# Patient Record
Sex: MALE | Race: WHITE | NOT HISPANIC OR LATINO | Employment: FULL TIME | URBAN - NONMETROPOLITAN AREA
[De-identification: names, ages, dates, MRNs, and addresses within clinical notes are randomized per-mention and may not be internally consistent; named-entity substitution may affect disease eponyms.]

---

## 2018-01-05 ENCOUNTER — OFFICE VISIT CONVERTED (OUTPATIENT)
Dept: FAMILY MEDICINE CLINIC | Age: 48
End: 2018-01-05
Attending: FAMILY MEDICINE

## 2018-11-20 ENCOUNTER — OFFICE VISIT CONVERTED (OUTPATIENT)
Dept: FAMILY MEDICINE CLINIC | Age: 48
End: 2018-11-20
Attending: NURSE PRACTITIONER

## 2019-10-04 ENCOUNTER — OFFICE VISIT CONVERTED (OUTPATIENT)
Dept: FAMILY MEDICINE CLINIC | Age: 49
End: 2019-10-04
Attending: NURSE PRACTITIONER

## 2019-10-07 ENCOUNTER — HOSPITAL ENCOUNTER (OUTPATIENT)
Dept: OTHER | Facility: HOSPITAL | Age: 49
Discharge: HOME | End: 2019-10-07

## 2021-05-18 NOTE — PROGRESS NOTES
Glenroy Sinha 1970     Office/Outpatient Visit    Visit Date: Fri, Oct 4, 2019 02:00 pm    Provider: Robyn Black N.P. (Assistant: Samra Brooks LPN)    Location: [facility]        Electronically signed by Robyn Black N.P. on  10/04/2019 05:17:19 PM                             SUBJECTIVE:        CC:     Chang is a 49 year old White male.  Left testicular aching pain         HPI:         PHQ-9 Depression Screening: Completed form scanned and in chart; Total Score 10         Additionally, he presents with history of testicular pain.  pt states L side testicular pain x 2 months. States intermittent, hard to sit correctly, lifting L buttocks due to pain. It was continued pain and his wife made him the appointment.  States no swelling or color change.     ROS:     CONSTITUTIONAL:  Negative for chills, fatigue and fever.      CARDIOVASCULAR:  Negative for chest pain, orthopnea, paroxysmal nocturnal dyspnea and pedal edema.      RESPIRATORY:  Negative for dyspnea and cough.      GASTROINTESTINAL:  Negative for abdominal pain, heartburn, constipation, diarrhea, and stool changes.      MUSCULOSKELETAL:  Negative for arthralgias, back pain, and myalgias.      INTEGUMENTARY:  Negative for atypical moles, dry skin, pruritis, and rashes.      PSYCHIATRIC:  Negative for anxiety and depression.          PMH/FMH/SH:     Last Reviewed on 10/04/2019 02:32 PM by Robyn Black    Past Medical History:     UNREMARKABLE         PREVENTIVE HEALTH MAINTENANCE             COLORECTAL CANCER SCREENING: Up to date (colonoscopy q10y; sigmoidoscopy q5y; Cologuard q3y) was last done 1/28/19, Results are in chart     DENTAL CLEANING: was last done 06/2018 [facility]     EYE EXAM: was last done 2015         Surgical History:         GANGLION SURG, FOOT, RIGHT SIDE;         Family History:     Father: Healthy     Mother: Healthy     Paternal Grandmother: Alzheimer's Disease         Social History:      Occupation: [employer] -      Marital Status:      Children: 1 step-child         Tobacco/Alcohol/Supplements:     Last Reviewed on 10/04/2019 02:32 PM by Robyn Black    Tobacco: He has a past history of cigarette smoking; quit date:  1998.          Alcohol: Frequency: Once a week When he drinks, the average quantity of alcohol is 2 drinks.   He typically consumes beer.      Caffeine:  He admits to consuming caffeine via soda ( 4 servings per day ).          Substance Abuse History:     Last Reviewed on 10/04/2019 02:32 PM by Robyn Black    None         Mental Health History:     Last Reviewed on 10/04/2019 02:32 PM by Robyn Black    NEGATIVE         Communicable Diseases (eg STDs):     Last Reviewed on 10/04/2019 02:32 PM by Robyn Black            Current Problems:     Last Reviewed on 10/04/2019 02:32 PM by Robyn Black    Hypertension     Urinary hesitancy     Family history of breast cancer     Screening for depression         Immunizations:     zzFluzone pf-quadrivalent 3 and up 10/20/2014     zzFluzone pf-quadrivalent 3 and up 11/17/2015     zzFluzone pf-quadrivalent 3 and up 10/21/2016     zzFluzone pf-quadrivalent 3 and up 10/30/2017     Fluzone Quadrivalent (3+ years) 10/8/2018     Fluzone Quadrivalent (3+ years) 10/4/2019         Allergies:     Last Reviewed on 10/04/2019 02:32 PM by Robyn Black      No Known Drug Allergies.         Current Medications:     Last Reviewed on 10/04/2019 02:32 PM by Robyn Black    One a Day Vitamin daily     OTC allergy 1 tab daily         OBJECTIVE:        Vitals:         Current: 10/4/2019 2:07:26 PM    Ht:  5 ft, 10.75 in;  Wt: 171 lbs;  BMI: 24.0    T: 97.6 F (oral);  BP: 128/87 mm Hg (left arm, sitting);  P: 49 bpm (left arm (BP Cuff), sitting);  sCr: 0.85 mg/dL;  GFR: 99.04        Exams:     PHYSICAL EXAM:     GENERAL: Vitals recorded well developed, well nourished;  well groomed;  no apparent distress;      EYES: lids and lacrimal system are normal in appearance; extraocular movements intact; conjunctiva and cornea are normal; PERRLA;     NECK:  supple, full ROM; no thyromegaly; no carotid bruits;     RESPIRATORY: normal respiratory rate and pattern with no distress; normal breath sounds with no rales, rhonchi, wheezes or rubs;     CARDIOVASCULAR: normal rate; rhythm is regular;  normal S1; normal S2; no systolic murmur; no cyanosis; no edema;     GASTROINTESTINAL: nontender, nondistended; no hepatosplenomegaly or masses; no bruits;     GENITOURINARY: testes: Pt Deferred;     SKIN:  no significant rashes or lesions; no suspicious moles;     MUSCULOSKELETAL:  Normal range of motion, strength and tone;     NEUROLOGICAL:  cranial nerves, motor and sensory function, reflexes, gait and coordination are all intact;     PSYCHIATRIC:  appropriate affect and demeanor; normal speech pattern; grossly normal memory;         Procedures:     Vaccination against other viral diseases, Influenza     1. Influenza, seasonal (children 3 years to adult): 0.5 ml unit dose given IM in the right upper arm; administered by ad;  lot number [id number]; expires 6/30/20             ASSESSMENT:           V79.0   Z13.31  Screening for depression              DDx:     V04.81   Z23  Vaccination against other viral diseases, Influenza              DDx:     608.89   N50.812  Testicular pain              DDx:         ORDERS:         Radiology/Test Orders:       [id number]  Ultrasound, scrotum and contents  (Send-Out)           Procedures Ordered:       72117  Immunization administration; one vaccine  (In-House)           Other Orders:       26515  Influenza virus vaccine, quadrivalent, split virus, preservative free 3 years of age & older  (In-House)                   PLAN:          Vaccination against other viral diseases, Influenza           Orders:       52594  Immunization administration; one vaccine  (In-House)         92212  Influenza virus vaccine,  quadrivalent, split virus, preservative free 3 years of age & older  (In-House)            Testicular pain         RADIOLOGY:  I have ordered Testicular Ultrasound to be done today.            Orders:       42184  Ultrasound, scrotum and contents  (Send-Out)               CHARGE CAPTURE:           Primary Diagnosis:     V79.0 Screening for depression            Z13.31    Encounter for screening for depression              Orders:          22894   Office/outpatient visit; established patient, level 3  (In-House)           V04.81 Vaccination against other viral diseases, Influenza            Z23    Encounter for immunization              Orders:          88086   Immunization administration; one vaccine  (In-House)             71248   Influenza virus vaccine, quadrivalent, split virus, preservative free 3 years of age & older  (In-House)           608.89 Testicular pain            N50.812    Left testicular pain

## 2021-05-18 NOTE — PROGRESS NOTES
Sinha Glenroy WICKBrandon 1970     Office/Outpatient Visit    Visit Date: Tue, Nov 20, 2018 02:20 pm    Provider: Suzi Pineda N.P. (Assistant: Sarah Spurling, MA)    Location: [facility]        Electronically signed by Suzi Pineda N.P. on  11/21/2018 02:37:20 PM                             SUBJECTIVE:        CC:     Chang is a 48 year old White male.  Pt has a sinus infection, he was taking an antibiotic that he had from a previous visit. Today is needing labs, his mother had breast cancer, and he has a gene that could be passed down to him and he wants to get checked out for everything. He has some forms with him to go over it.          HPI:         Upper respiratory illness noted.  These have been present for the past 2 weeks.  The symptoms include headache, nasal congestion, sinus pain/pressure and amoxicillin taken from old Rx  taking 1 time per day daily.  He denies fever.  He has already tried to relieve the symptoms with sudfed.  Pertinent medical history is unremarkable.          Complaint of family history of breast cancer..  mother was diagnosed with breast cancer  Did have genetic testing and was told that she carried the PALB2 gene which puts increased risk for pancreatic cancer and breast         With regard to the health checkup, he cannot recall when he last had a physical exam.  His last ECG was several years ago and was normal.  Depression screen is performed and is negative.  He is current with his influenza immunization.      Smoking status: Former smoker     ROS:     CONSTITUTIONAL:  Positive for fatigue.   Negative for chills or fever.      EYES:  Positive for blurred vision ( reading ).      E/N/T:  Positive for diminished hearing ( right ), nasal congestion and sinus pressure.   Negative for ear pain.      CARDIOVASCULAR:  Positive for pedal edema ( mild ).   Negative for chest pain.      RESPIRATORY:  Negative for recent cough, dyspnea and frequent wheezing.       GASTROINTESTINAL:  Negative for abdominal pain, acid reflux symptoms, constipation, diarrhea, heartburn, melena, nausea and vomiting.      GENITOURINARY:  Positive for decreased force and (started about year) of urine stream.   Negative for dysuria.      MUSCULOSKELETAL:  Positive for joint stiffness (stiffness in finger).   Negative for arthralgias or myalgias.      INTEGUMENTARY:  Positive for rash.   Negative for pruritis.      NEUROLOGICAL:  Negative for dizziness, fainting, headaches and paresthesias.      HEMATOLOGIC/LYMPHATIC:  Negative for easy bruising and lymphadenopathy.      ENDOCRINE:  Negative for hair loss, polydipsia and polyphagia.      ALLERGIC/IMMUNOLOGIC:  Negative for seasonal allergies.      PSYCHIATRIC:  Negative for anxiety, crying spells, depression, feelings of stress, sadness, sleep disturbance and suicidal thoughts.          PMH/FMH/SH:     Last Reviewed on 1/05/2018 03:53 PM by Margi Carbajal    Past Medical History:     UNREMARKABLE         PREVENTIVE HEALTH MAINTENANCE             COLONOSCOPY: has never been done     PNEUMOCOCCAL 23 VACCINE: has never been done     Prevnar 13: has never been done     INFLUENZA VACCINE: current     EKG: with normal results         Surgical History:         GANGLION SURG, FOOT, RIGHT SIDE;         Family History:     Father: Healthy     Mother: Healthy     Paternal Grandmother: Alzheimer's Disease         Social History:         Marital Status:      Children: 1 step-child         Tobacco/Alcohol/Supplements:     Last Reviewed on 1/05/2018 03:53 PM by Margi Carbajal    Tobacco: He has a past history of cigarette smoking; quit date:  1998.          Alcohol: Frequency: Once a week When he drinks, the average quantity of alcohol is 2 drinks.   He typically consumes beer.          Substance Abuse History:     Last Reviewed on 1/05/2018 03:53 PM by Margi Carbajal        Mental Health History:     Last Reviewed on 1/05/2018 03:53 PM by Solomon  Margi        Communicable Diseases (eg STDs):     Last Reviewed on 1/05/2018 03:53 PM by Margi Carbajal            Current Problems:     Last Reviewed on 1/05/2018 03:53 PM by Margi Carbajal      None Recorded         Immunizations:     zzFluzone pf-quadrivalent 3 and up 10/20/2014     zzFluzone pf-quadrivalent 3 and up 11/17/2015     zzFluzone pf-quadrivalent 3 and up 10/21/2016     zzFluzone pf-quadrivalent 3 and up 10/30/2017     Fluzone Quadrivalent (3+ years) 10/8/2018         Allergies:     Last Reviewed on 1/05/2018 03:53 PM by Margi Carbajal      No Known Drug Allergies.         Current Medications:     Last Reviewed on 1/05/2018 03:53 PM by Margi Carbajal    None        OBJECTIVE:        Vitals:         Historical:     01/05/2018  BP:   155/93 mm Hg ( (left arm, , sitting, );)         Current: 11/20/2018 2:29:31 PM    Ht:  5 ft, 10.75 in;  Wt: 173.4 lbs;  BMI: 24.4    T: 98.4 F (oral);  BP: 153/107 mm Hg (left arm, sitting);  P: 67 bpm (left arm (BP Cuff), sitting);  sCr: 0.92 mg/dL;  GFR: 93.04        Repeat:     3:13:20 PM     BP:   152/92mm Hg (right arm, sitting)         Exams:     PHYSICAL EXAM:     GENERAL: Vitals recorded well developed, well nourished;  no apparent distress;     EYES: PERRL, EOMI     E/N/T: EARS: external auditory canal normal bilaterally;  bilateral TMs are normal;  NOSE:  normal nasal mucosa, septum, turbinates, and sinuses; OROPHARYNX:  normal mucosa, dentition, gingiva, and posterior pharynx;     NECK: range of motion is normal;     RESPIRATORY: normal appearance and symmetric expansion of chest wall; normal respiratory rate and pattern with no distress; normal breath sounds with no rales, rhonchi, wheezes or rubs;     CARDIOVASCULAR: normal rate; rhythm is regular;  no edema;     GASTROINTESTINAL: nontender; normal bowel sounds; no organomegaly;     LYMPHATIC: no enlargement of cervical or facial nodes; no supraclavicular nodes;     MUSCULOSKELETAL: normal gait; normal  range of motion of all major muscle groups; no limb or joint pain with range of motion;     NEUROLOGIC: mental status: alert and oriented x 3;     PSYCHIATRIC: appropriate affect and demeanor; normal speech pattern; normal thought and perception;         ASSESSMENT           V70.0   Z00.00  Health checkup              DDx:     401.1   I10  Hypertension              DDx:     461.0   J01.00  Acute maxillary sinusitis              DDx:     788.64   R39.11  Urinary hesitancy              DDx:     V16.3   Z80.3  Family history of breast cancer              DDx:     V76.49   Z12.11  Screening for colon cancer              DDx:         ORDERS:         Meds Prescribed:       Amoxicillin 875mg Tablet take 1 tab twice daily x 5 days  #10 (Ten) tablet(s) Refills: 0         Lab Orders:       [id number]  [facility] PHYSICAL: CMP, CBC, TSH, LIPID: [id number], [id number], 75230, [id number]  (Send-Out)         *  [employer] Medicare screening PSA  (Send-Out)         53961  [facility] Urinalysis, automated, with micro  (Send-Out)           Procedures Ordered:       REFER  Referral to Specialist or Other Facility  (Send-Out)                   PLAN:          Health checkup     LABORATORY:  Labs ordered to be performed today include PHYSICAL PANEL; CMP, CBC, TSH, LIPID.            Orders:       [id number]  [facility] PHYSICAL: CMP, CBC, TSH, LIPID: [id number], [id number], [id number], 03333  (Send-Out)            Hypertension follow up with PCP regarding BP - discussed low salt, caffiene restriction to see if this helps          Acute maxillary sinusitis           Prescriptions:       Amoxicillin 875mg Tablet take 1 tab twice daily x 5 days  #10 (Ten) tablet(s) Refills: 0          Urinary hesitancy     LABORATORY:  Labs ordered to be performed today include PSA and urinalysis with micro.            Orders:       *  [employer] Medicare screening PSA  (Send-Out)         18105  [facility] Urinalysis, automated, with micro  (Send-Out)            Family history of breast  cancer         RECOMMENDATIONS given include: Refer to [facility] genetic testing center for further workup.  [phone] number given to Mr. Sinha          Screening for colon cancer         REFERRALS:  Referral initiated to a general surgeon ( Dr. Benjamín Martinez ).            Orders:       REFER  Referral to Specialist or Other Facility  (Send-Out)               Patient Recommendations:        For  Family history of breast cancer:     I also recommend Refer to [facility] genetic testing center for further workup.              CHARGE CAPTURE           **Please note: ICD descriptions below are intended for billing purposes only and may not represent clinical diagnoses**        Primary Diagnosis:         V70.0 Health checkup            Z00.00    Encntr for general adult medical exam w/o abnormal findings              Orders:          41941   Preventive medicine, established patient, age 40-64 years  (In-House)             81094 -25  Office/outpatient visit; established patient, level 3  (In-House)           401.1 Hypertension            I10    Essential (primary) hypertension    461.0 Acute maxillary sinusitis            J01.00    Acute maxillary sinusitis, unspecified    788.64 Urinary hesitancy            R39.11    Hesitancy of micturition    V16.3 Family history of breast cancer            Z80.3    Family history of malignant neoplasm of breast    V76.49 Screening for colon cancer            Z12.11    Encounter for screening for malignant neoplasm of colon        ADDENDUMS:      ____________________________________    Date: 12/10/2018 03:26 PM    Author: Alma Cohen         Visit Note Faxed to:        Benjamín Martinez  (General Surgery); Number [phone]

## 2021-05-18 NOTE — PROGRESS NOTES
Glenroy Sinha 1970     Office/Outpatient Visit    Visit Date: Fri, Jan 5, 2018 03:40 pm    Provider: Margi Carbajal MD (Assistant: Gita Christensen MA)    Location: [facility]        Electronically signed by Margi Carbajal MD on  01/05/2018 04:41:52 PM                             SUBJECTIVE:        CC:     Chang is a 47 year old White male.  sore throat         HPI:     Chang is here today with c/o sore throat.  +cough, productive of mild sputum.  He has been sick about a week.  He has tried multiple OTC remedies.  +Intermittent subjective fevers and chills.  +Headaches.  No ear pain or pressure.  +sinus pain or pressure.  +Rhinorrhea and congestion.  No CP or SOB.   No abd pain, N/V/D.  No myalgias.  Sick contacts:  wife with similar sx.  He did have a flu shot this year.     ROS:     CONSTITUTIONAL:  Positive for fatigue and malaise.   Negative for chills or fever.      EYES:  Negative for blurred vision.      E/N/T:  Positive for nasal congestion, frequent rhinorrhea, hoarseness and sore throat.   Negative for ear pain or diminished hearing.      CARDIOVASCULAR:  Negative for chest pain and palpitations.      RESPIRATORY:  Positive for recent cough.   Negative for dyspnea or frequent wheezing.      GASTROINTESTINAL:  Negative for abdominal pain, diarrhea, nausea and vomiting.      MUSCULOSKELETAL:  Positive for myalgias.          PMH/FMH/SH:     Last Reviewed on 1/05/2018 03:53 PM by Margi Carbajal    Past Medical History:     UNREMARKABLE         PREVENTIVE HEALTH MAINTENANCE             COLONOSCOPY: has never been done     PNEUMOCOCCAL 23 VACCINE: has never been done     Prevnar 13: has never been done     INFLUENZA VACCINE: current     EKG: with normal results         Surgical History:         GANGLION SURG, FOOT, RIGHT SIDE;         Family History:     Father: Healthy     Mother: Healthy     Paternal Grandmother: Alzheimer's Disease         Social History:         Marital Status:       Children: 1 step-child         Tobacco/Alcohol/Supplements:     Last Reviewed on 1/05/2018 03:53 PM by Margi Carbajal    Tobacco: He has a past history of cigarette smoking; quit date:  1998.          Alcohol: Frequency: Once a week When he drinks, the average quantity of alcohol is 2 drinks.   He typically consumes beer.          Substance Abuse History:     Last Reviewed on 1/05/2018 03:53 PM by Margi Carbajal        Mental Health History:     Last Reviewed on 1/05/2018 03:53 PM by Margi Carbajal        Communicable Diseases (eg STDs):     Last Reviewed on 1/05/2018 03:53 PM by Margi Carbajal            Current Problems:     Last Reviewed on 3/17/2017 03:03 PM by Robyn Black    High risk sexual behavior     Cellulitis     Bradycardia         Immunizations:     Fluzone pf-quadrivalent 3 and up 10/20/2014     Fluzone pf-quadrivalent 3 and up 11/17/2015     Fluzone pf-quadrivalent 3 and up 10/21/2016     Fluzone pf-quadrivalent 3 and up 10/30/2017         Allergies:     Last Reviewed on 3/17/2017 03:03 PM by Robyn Black      No Known Drug Allergies.         Current Medications:     Last Reviewed on 3/17/2017 03:03 PM by Robyn Black    OTC allergy medication         OBJECTIVE:        Vitals:         Current: 1/5/2018 3:45:49 PM    Ht:  5 ft, 10.75 in;  Wt: 172.6 lbs;  BMI: 24.2    T: 98.7 F (oral);  BP: 155/93 mm Hg (left arm, sitting);  P: 92 bpm (left arm (BP Cuff), sitting);  sCr: 0.92 mg/dL;  GFR: 93.85        Exams:     PHYSICAL EXAM:     GENERAL: Vitals recorded well developed, well nourished;  well groomed;  no apparent distress;     EYES: extraocular movements intact; conjunctiva and cornea are normal; PERRLA;     E/N/T: EARS:  normal external auditory canals and tympanic membranes;  grossly normal hearing; NOSE: nasal mucosa is edematous and erythematous;  OROPHARYNX: oral mucosa reveals erythema;  posterior pharynx shows a posterior pharyngeal exudate;     RESPIRATORY:  normal respiratory rate and pattern with no distress; normal breath sounds with no rales, rhonchi, wheezes or rubs;     CARDIOVASCULAR: normal rate; rhythm is regular;  no systolic murmur;     GASTROINTESTINAL: nontender; normal bowel sounds;     LYMPHATIC: bilateral anterior cervical nodes ( tender, mobile );     MUSCULOSKELETAL: normal gait; normal overall tone         Lab/Test Results:             Rapid Strep Screen:  Negative (01/05/2018),     Performed by::  lola (01/05/2018),             ASSESSMENT           465.9   J06.9  Viral URI              DDx:     462   J06.9  Sore throat              DDx:         ORDERS:         Meds Prescribed:       Promethazine with Dextromethrophan 6.25mg/15mg per 5ml Syrup 1 tsp at bedtime PRN for cough  #4 (Four) oz Refills: 0       Mucinex (Guaifenesin) 600mg Tablets, Extended Release 1 bid prn  #30 (Thirty) tablet(s) Refills: 0         Lab Orders:       [id number]  Group A Streptococcus detection by immunoassay with direct optical observation  (In-House)         52283  [facility] Throat culture, strep  (Send-Out)                   PLAN:          Viral URI Viral URI.  No evidence of bacterial infection.  Rx sent for cough syrup for bedtime.  Symptomatic care recommended with OTC analgesics for pain/fever, OTC decongestants and cough suppressants prn.  Stay well hydrated.  Hot tea with lemon and honey.  RTC prn worsening or failure to improve of sx.  Work note given for tonight.           Prescriptions:       Promethazine with Dextromethrophan 6.25mg/15mg per 5ml Syrup 1 tsp at bedtime PRN for cough  #4 (Four) oz Refills: 0       Mucinex (Guaifenesin) 600mg Tablets, Extended Release 1 bid prn  #30 (Thirty) tablet(s) Refills: 0           Patient Education Handouts:       Common Cold           Sore throat           Orders:       [id number]  Group A Streptococcus detection by immunoassay with direct optical observation  (In-House)         38640  [facility] Throat culture, strep  (Send-Out)              Patient Education Handouts:       [facility] Medication Compliance              CHARGE CAPTURE           **Please note: ICD descriptions below are intended for billing purposes only and may not represent clinical diagnoses**        Primary Diagnosis:         465.9 Viral URI            J06.9    Acute upper respiratory infection, unspecified              Orders:          24707   Office/outpatient visit; established patient, level 3  (In-House)           462 Sore throat            J06.9    Acute upper respiratory infection, unspecified              Orders:          85421   Group A Streptococcus detection by immunoassay with direct optical observation  (In-House)               ADDENDUMS:      ____________________________________    Addendum: 01/09/2018 10:38 AM - Margi Carbajal        For 462 Sore throat, please emove:  J06.9 Acute upper respiratory infection, unspecified and add: J02.9 Sore throat.  Thanks, GENA

## 2021-07-01 VITALS
TEMPERATURE: 98.7 F | SYSTOLIC BLOOD PRESSURE: 155 MMHG | WEIGHT: 172.6 LBS | BODY MASS INDEX: 24.16 KG/M2 | HEART RATE: 92 BPM | DIASTOLIC BLOOD PRESSURE: 93 MMHG | HEIGHT: 71 IN

## 2021-07-01 VITALS
BODY MASS INDEX: 24.27 KG/M2 | HEIGHT: 71 IN | WEIGHT: 173.4 LBS | TEMPERATURE: 98.4 F | DIASTOLIC BLOOD PRESSURE: 92 MMHG | HEART RATE: 67 BPM | SYSTOLIC BLOOD PRESSURE: 152 MMHG

## 2021-07-01 VITALS
DIASTOLIC BLOOD PRESSURE: 87 MMHG | WEIGHT: 171 LBS | SYSTOLIC BLOOD PRESSURE: 128 MMHG | BODY MASS INDEX: 23.94 KG/M2 | HEIGHT: 71 IN | HEART RATE: 49 BPM | TEMPERATURE: 97.6 F

## 2021-11-08 ENCOUNTER — CLINICAL SUPPORT (OUTPATIENT)
Dept: FAMILY MEDICINE CLINIC | Age: 51
End: 2021-11-08

## 2021-11-08 DIAGNOSIS — Z23 ENCOUNTER FOR IMMUNIZATION: Primary | ICD-10-CM

## 2021-11-08 PROCEDURE — 90471 IMMUNIZATION ADMIN: CPT | Performed by: FAMILY MEDICINE

## 2021-11-08 PROCEDURE — 90686 IIV4 VACC NO PRSV 0.5 ML IM: CPT | Performed by: FAMILY MEDICINE

## 2021-11-08 NOTE — PROGRESS NOTES
I have reviewed the notes, assessments, and/or procedures performed by Yvonne Vinson LPN, and I concur with her documentation of Glenroy Sinha.

## 2022-06-02 ENCOUNTER — OFFICE VISIT (OUTPATIENT)
Dept: FAMILY MEDICINE CLINIC | Age: 52
End: 2022-06-02

## 2022-06-02 VITALS
BODY MASS INDEX: 24.16 KG/M2 | DIASTOLIC BLOOD PRESSURE: 104 MMHG | HEIGHT: 71 IN | OXYGEN SATURATION: 97 % | HEART RATE: 59 BPM | TEMPERATURE: 98.2 F | WEIGHT: 172.6 LBS | SYSTOLIC BLOOD PRESSURE: 145 MMHG

## 2022-06-02 DIAGNOSIS — R52 BODY ACHES: ICD-10-CM

## 2022-06-02 DIAGNOSIS — R09.81 NASAL CONGESTION: ICD-10-CM

## 2022-06-02 DIAGNOSIS — R05.9 COUGH: ICD-10-CM

## 2022-06-02 DIAGNOSIS — U07.1 COVID-19: Primary | ICD-10-CM

## 2022-06-02 LAB
EXPIRATION DATE: ABNORMAL
EXPIRATION DATE: NORMAL
FLUAV AG NPH QL: NEGATIVE
FLUBV AG NPH QL: NEGATIVE
INTERNAL CONTROL: ABNORMAL
INTERNAL CONTROL: NORMAL
Lab: ABNORMAL
Lab: NORMAL
SARS-COV-2 AG UPPER RESP QL IA.RAPID: DETECTED

## 2022-06-02 PROCEDURE — 99212 OFFICE O/P EST SF 10 MIN: CPT | Performed by: NURSE PRACTITIONER

## 2022-06-02 PROCEDURE — 87426 SARSCOV CORONAVIRUS AG IA: CPT | Performed by: NURSE PRACTITIONER

## 2022-06-02 PROCEDURE — 87804 INFLUENZA ASSAY W/OPTIC: CPT | Performed by: NURSE PRACTITIONER

## 2022-06-02 RX ORDER — DIPHENOXYLATE HYDROCHLORIDE AND ATROPINE SULFATE 2.5; .025 MG/1; MG/1
TABLET ORAL DAILY
COMMUNITY

## 2022-06-02 RX ORDER — DEXTROMETHORPHAN HYDROBROMIDE AND PROMETHAZINE HYDROCHLORIDE 15; 6.25 MG/5ML; MG/5ML
5 SYRUP ORAL 4 TIMES DAILY PRN
Qty: 180 ML | Refills: 0 | Status: SHIPPED | OUTPATIENT
Start: 2022-06-02

## 2022-06-02 RX ORDER — AZITHROMYCIN 250 MG/1
TABLET, FILM COATED ORAL
Qty: 6 TABLET | Refills: 0 | Status: SHIPPED | OUTPATIENT
Start: 2022-06-02

## 2022-06-02 NOTE — PATIENT INSTRUCTIONS
Avoid close contact with others for 10 days after symptoms started plus 3 days without a fever  Avoid lying on your back  1 tsp of honey over the age of one to help with cough  Drink fluids regularly to avoid dehydration   You may alternate the use of Tylenol and Ibuprofen as directed   Go to the ER if symptoms do not improve of worsen rapidly

## 2022-06-02 NOTE — PROGRESS NOTES
Patient here today for concerns of possible covid infection or URI     Known Exposure to positive case?   no  Date of exposure?   n/a  Date of symptoms start?   5/31/2022  (Symptoms may appear 2-14 days after exposure )    Fever or chills?   yes  Cough?   yes  Shortness of breath or difficulty breathing?  no  Fatigue?   yes  Muscle or body aches?  yes   Headache?   yes  New loss of taste or smell? no  Sore throat?  no  Congestion or runny nose? yes  Nausea or vomiting?   no  Diarrhea?   no  Any  emergency warning signs for COVID-19.   Trouble breathing?  no  Persistent pain or pressure in the chest?   no  New confusion? no  Inability to wake or stay awake?no  Pale, gray, or blue-colored skin, lips, or nail beds, depending on skin tone?   no    Taking any medications at home to help with symptoms?no  Any prior vaccine to covid?   yes  Any significant health problems / existing lung / heart problems?  no  Interested in monoclonal antibody treatment if test result is positive? no  (must be symptomatic, have a positive PCR covid test and meet ONE of the following criteria and be at least 18 YOA)  Age 65 or older  Obesity (BMI of 25 or more)  Pregnancy  Immunosuppressed  Diabetes  CKD  Cardiovascular disease, hypertension, chronic lung disease  Sickle cell disease   Neurodevelopment disorderChief Complaint  Nasal Congestion, Cough, and Generalized Body Aches (Pt complaint of body aches and chills 2 days ago. )    Subjective        Glenroy Sinha presents to [facility]  Cough  This is a new problem. The current episode started in the past 7 days. The problem has been gradually improving. The cough is productive of sputum. Associated symptoms include chills and nasal congestion. Pertinent negatives include no fever or shortness of breath. He has tried nothing for the symptoms. The treatment provided no relief. His past medical history is significant for environmental allergies.  "      Objective   Vital Signs:  BP (!) 145/104   Pulse 59   Temp 98.2 °F (36.8 °C) (Oral)   Ht 179.7 cm (70.75\")   Wt 78.3 kg (172 lb 9.6 oz)   SpO2 97%   BMI 24.24 kg/m²     BMI is within normal parameters. No other follow-up for BMI required.      Physical Exam  HENT:      Head: Normocephalic.      Nose: Congestion present.   Cardiovascular:      Rate and Rhythm: Normal rate and regular rhythm.   Pulmonary:      Effort: Pulmonary effort is normal. No respiratory distress.      Breath sounds: Normal breath sounds. No stridor. No wheezing, rhonchi or rales.   Skin:     General: Skin is warm and dry.   Neurological:      Mental Status: He is alert and oriented to person, place, and time.   Psychiatric:         Mood and Affect: Mood normal.        Result Review :                Assessment and Plan   Diagnoses and all orders for this visit:    1. COVID-19 (Primary)  -     azithromycin (Zithromax Z-Elmer) 250 MG tablet; Take 2 tablets by mouth on day 1, then 1 tablet daily on days 2-5  Dispense: 6 tablet; Refill: 0    2. Nasal congestion  -     POCT SARS-CoV-2 Antigen ARETHA  -     POCT Influenza A/B    3. Cough  -     POCT SARS-CoV-2 Antigen ARETHA  -     POCT Influenza A/B  -     promethazine-dextromethorphan (PROMETHAZINE-DM) 6.25-15 MG/5ML syrup; Take 5 mL by mouth 4 (Four) Times a Day As Needed for Cough.  Dispense: 180 mL; Refill: 0    4. Body aches  -     POCT SARS-CoV-2 Antigen ARETHA  -     POCT Influenza A/B             Follow Up   Return if symptoms worsen or fail to improve.  Patient was given instructions and counseling regarding his condition or for health maintenance advice. Please see specific information pulled into the AVS if appropriate.       "

## 2024-10-25 ENCOUNTER — OFFICE VISIT (OUTPATIENT)
Dept: FAMILY MEDICINE CLINIC | Age: 54
End: 2024-10-25
Payer: COMMERCIAL

## 2024-10-25 VITALS
OXYGEN SATURATION: 93 % | TEMPERATURE: 98.4 F | WEIGHT: 174 LBS | DIASTOLIC BLOOD PRESSURE: 84 MMHG | HEIGHT: 71 IN | SYSTOLIC BLOOD PRESSURE: 137 MMHG | HEART RATE: 88 BPM | BODY MASS INDEX: 24.36 KG/M2

## 2024-10-25 DIAGNOSIS — R03.0 ELEVATED BLOOD PRESSURE READING IN OFFICE WITHOUT DIAGNOSIS OF HYPERTENSION: ICD-10-CM

## 2024-10-25 DIAGNOSIS — R05.1 ACUTE COUGH: Primary | ICD-10-CM

## 2024-10-25 LAB
EXPIRATION DATE: NORMAL
FLUAV AG UPPER RESP QL IA.RAPID: NOT DETECTED
FLUBV AG UPPER RESP QL IA.RAPID: NOT DETECTED
INTERNAL CONTROL: NORMAL
Lab: NORMAL
SARS-COV-2 AG UPPER RESP QL IA.RAPID: NOT DETECTED

## 2024-10-25 PROCEDURE — 99213 OFFICE O/P EST LOW 20 MIN: CPT

## 2024-10-25 PROCEDURE — 87428 SARSCOV & INF VIR A&B AG IA: CPT

## 2024-10-25 NOTE — PROGRESS NOTES
"Subjective     CHIEF COMPLAINT    Chief Complaint   Patient presents with    Cough     X 4 days.  Negative for fever, chills, nausea, vomiting, constipation, diarrhea       History of Present Illness  Patient is a 54-year-old male, presenting to the clinic today with complaints of congestion, cough, subjective fever.  He thinks he may have a sinus infection.  Denies any shortness of breath, wheezing or chest pain.  He does not smoke.  He states he has been breathing through his mouth.  He does take Claritin for allergies.  Symptoms have been present for about 4 days.    Review of Systems   Constitutional:  Positive for fever (subjective). Negative for chills.   HENT:  Positive for congestion. Negative for rhinorrhea and sore throat.    Respiratory:  Positive for cough. Negative for shortness of breath and wheezing.    Cardiovascular:  Negative for chest pain.   Gastrointestinal:  Negative for constipation, diarrhea, nausea and vomiting.   Musculoskeletal:  Negative for myalgias.   Neurological:  Positive for headaches.       History reviewed. No pertinent past medical history.         History reviewed. No pertinent surgical history.         History reviewed. No pertinent family history.         Social History     Socioeconomic History    Marital status:    Tobacco Use    Smoking status: Never    Smokeless tobacco: Never   Vaping Use    Vaping status: Never Used   Substance and Sexual Activity    Alcohol use: Never    Drug use: Never    Sexual activity: Defer            No Known Allergies         Current Outpatient Medications on File Prior to Visit   Medication Sig Dispense Refill    Loratadine (CLARITIN PO) Take  by mouth Daily.      multivitamin (ONE-A-DAY MENS PO) Take  by mouth Daily.       No current facility-administered medications on file prior to visit.     /84   Pulse 88   Temp 98.4 °F (36.9 °C) (Oral)   Ht 179.7 cm (70.75\")   Wt 78.9 kg (174 lb)   SpO2 93%   BMI 24.44 kg/m² "       Objective     Physical Exam  Vitals and nursing note reviewed.   Constitutional:       General: He is not in acute distress.     Appearance: Normal appearance. He is not ill-appearing.   HENT:      Head: Normocephalic.      Right Ear: Tympanic membrane, ear canal and external ear normal.      Left Ear: Tympanic membrane, ear canal and external ear normal.      Nose: Nose normal.      Right Sinus: No maxillary sinus tenderness or frontal sinus tenderness.      Left Sinus: No maxillary sinus tenderness or frontal sinus tenderness.      Mouth/Throat:      Lips: Pink.      Mouth: Mucous membranes are moist.      Pharynx: Oropharynx is clear. Uvula midline. No pharyngeal swelling, oropharyngeal exudate, posterior oropharyngeal erythema or uvula swelling.   Eyes:      Pupils: Pupils are equal, round, and reactive to light.   Cardiovascular:      Rate and Rhythm: Normal rate and regular rhythm.      Heart sounds: Normal heart sounds. No murmur heard.  Pulmonary:      Effort: Pulmonary effort is normal. No accessory muscle usage or respiratory distress.      Breath sounds: Normal breath sounds. No wheezing or rhonchi.   Musculoskeletal:      Cervical back: Normal range of motion.   Lymphadenopathy:      Cervical: No cervical adenopathy.   Skin:     General: Skin is warm and dry.   Neurological:      General: No focal deficit present.      Mental Status: He is alert and oriented to person, place, and time.   Psychiatric:         Mood and Affect: Mood and affect normal.         Behavior: Behavior normal.       Results for orders placed or performed in visit on 10/25/24   POCT SARS-CoV-2 Antigen ARETHA + Flu    Collection Time: 10/25/24  3:45 PM    Specimen: Swab   Result Value Ref Range    SARS Antigen Not Detected Not Detected, Presumptive Negative    Influenza A Antigen ARETHA Not Detected Not Detected    Influenza B Antigen ARETHA Not Detected Not Detected    Internal Control Passed Passed    Lot Number [id number]     Expiration  Date 06/28/25            Assessment & Plan  Acute cough    Elevated blood pressure reading in office without diagnosis of hypertension  Blood pressure elevated on exam, improved on recheck.  Follow-up with PCP.    Patient is negative for COVID and flu on rapid testing today.  No evidence of bacterial infection on exam.  Suspect viral illness.  Suspect viral illness.  Recommend symptomatic treatment which was discussed with patient.  Increase fluid intake and rest.  Return and ER precautions advised.    Orders Placed This Encounter   Procedures    POCT SARS-CoV-2 Antigen ARETHA + Flu        Follow up:  Return if symptoms worsen or fail to improve.  Patient was given instructions and counseling regarding his condition or for health maintenance advice. Please see specific information pulled into the AVS if appropriate.

## 2024-11-11 ENCOUNTER — OFFICE VISIT (OUTPATIENT)
Dept: FAMILY MEDICINE CLINIC | Age: 54
End: 2024-11-11
Payer: COMMERCIAL

## 2024-11-11 ENCOUNTER — LAB (OUTPATIENT)
Dept: LAB | Facility: HOSPITAL | Age: 54
End: 2024-11-11
Payer: COMMERCIAL

## 2024-11-11 VITALS
HEART RATE: 76 BPM | WEIGHT: 172 LBS | TEMPERATURE: 98.2 F | BODY MASS INDEX: 24.08 KG/M2 | DIASTOLIC BLOOD PRESSURE: 97 MMHG | HEIGHT: 71 IN | SYSTOLIC BLOOD PRESSURE: 162 MMHG

## 2024-11-11 DIAGNOSIS — R03.0 ELEVATED BLOOD PRESSURE READING IN OFFICE WITHOUT DIAGNOSIS OF HYPERTENSION: ICD-10-CM

## 2024-11-11 DIAGNOSIS — Z13.220 SCREENING, LIPID: ICD-10-CM

## 2024-11-11 DIAGNOSIS — Z12.5 SCREENING FOR PROSTATE CANCER: ICD-10-CM

## 2024-11-11 DIAGNOSIS — Z13.1 SCREENING FOR DIABETES MELLITUS: ICD-10-CM

## 2024-11-11 DIAGNOSIS — Z00.00 ANNUAL PHYSICAL EXAM: Primary | ICD-10-CM

## 2024-11-11 DIAGNOSIS — Z23 ENCOUNTER FOR IMMUNIZATION: ICD-10-CM

## 2024-11-11 DIAGNOSIS — Z11.59 NEED FOR HEPATITIS C SCREENING TEST: ICD-10-CM

## 2024-11-11 LAB
ALBUMIN SERPL-MCNC: 4.4 G/DL (ref 3.5–5.2)
ALBUMIN/GLOB SERPL: 1.3 G/DL
ALP SERPL-CCNC: 133 U/L (ref 39–117)
ALT SERPL W P-5'-P-CCNC: 33 U/L (ref 1–41)
ANION GAP SERPL CALCULATED.3IONS-SCNC: 8 MMOL/L (ref 5–15)
AST SERPL-CCNC: 22 U/L (ref 1–40)
BASOPHILS # BLD AUTO: 0.04 10*3/MM3 (ref 0–0.2)
BASOPHILS NFR BLD AUTO: 0.4 % (ref 0–1.5)
BILIRUB SERPL-MCNC: 0.3 MG/DL (ref 0–1.2)
BUN SERPL-MCNC: 9 MG/DL (ref 6–20)
BUN/CREAT SERPL: 10.6 (ref 7–25)
CALCIUM SPEC-SCNC: 10.1 MG/DL (ref 8.6–10.5)
CHLORIDE SERPL-SCNC: 103 MMOL/L (ref 98–107)
CHOLEST SERPL-MCNC: 237 MG/DL (ref 0–200)
CO2 SERPL-SCNC: 28 MMOL/L (ref 22–29)
CREAT SERPL-MCNC: 0.85 MG/DL (ref 0.76–1.27)
DEPRECATED RDW RBC AUTO: 41.2 FL (ref 37–54)
EGFRCR SERPLBLD CKD-EPI 2021: 102.6 ML/MIN/1.73
EOSINOPHIL # BLD AUTO: 0.41 10*3/MM3 (ref 0–0.4)
EOSINOPHIL NFR BLD AUTO: 4.5 % (ref 0.3–6.2)
ERYTHROCYTE [DISTWIDTH] IN BLOOD BY AUTOMATED COUNT: 11.7 % (ref 12.3–15.4)
GLOBULIN UR ELPH-MCNC: 3.4 GM/DL
GLUCOSE SERPL-MCNC: 102 MG/DL (ref 65–99)
HBA1C MFR BLD: 5.9 % (ref 4.8–5.6)
HCT VFR BLD AUTO: 45.3 % (ref 37.5–51)
HCV AB SER QL: NORMAL
HDLC SERPL-MCNC: 33 MG/DL (ref 40–60)
HGB BLD-MCNC: 14.3 G/DL (ref 13–17.7)
IMM GRANULOCYTES # BLD AUTO: 0.01 10*3/MM3 (ref 0–0.05)
IMM GRANULOCYTES NFR BLD AUTO: 0.1 % (ref 0–0.5)
LDLC SERPL CALC-MCNC: 174 MG/DL (ref 0–100)
LDLC/HDLC SERPL: 5.19 {RATIO}
LYMPHOCYTES # BLD AUTO: 3.41 10*3/MM3 (ref 0.7–3.1)
LYMPHOCYTES NFR BLD AUTO: 37.7 % (ref 19.6–45.3)
MCH RBC QN AUTO: 30 PG (ref 26.6–33)
MCHC RBC AUTO-ENTMCNC: 31.6 G/DL (ref 31.5–35.7)
MCV RBC AUTO: 95 FL (ref 79–97)
MONOCYTES # BLD AUTO: 0.61 10*3/MM3 (ref 0.1–0.9)
MONOCYTES NFR BLD AUTO: 6.7 % (ref 5–12)
NEUTROPHILS NFR BLD AUTO: 4.57 10*3/MM3 (ref 1.7–7)
NEUTROPHILS NFR BLD AUTO: 50.6 % (ref 42.7–76)
PLATELET # BLD AUTO: 400 10*3/MM3 (ref 140–450)
PMV BLD AUTO: 9.4 FL (ref 6–12)
POTASSIUM SERPL-SCNC: 4.6 MMOL/L (ref 3.5–5.2)
PROT SERPL-MCNC: 7.8 G/DL (ref 6–8.5)
PSA SERPL-MCNC: 0.25 NG/ML (ref 0–4)
RBC # BLD AUTO: 4.77 10*6/MM3 (ref 4.14–5.8)
SODIUM SERPL-SCNC: 139 MMOL/L (ref 136–145)
TRIGL SERPL-MCNC: 163 MG/DL (ref 0–150)
TSH SERPL DL<=0.05 MIU/L-ACNC: 2.21 UIU/ML (ref 0.27–4.2)
VLDLC SERPL-MCNC: 30 MG/DL (ref 5–40)
WBC NRBC COR # BLD AUTO: 9.05 10*3/MM3 (ref 3.4–10.8)

## 2024-11-11 PROCEDURE — 86803 HEPATITIS C AB TEST: CPT

## 2024-11-11 PROCEDURE — 99396 PREV VISIT EST AGE 40-64: CPT | Performed by: NURSE PRACTITIONER

## 2024-11-11 PROCEDURE — 83036 HEMOGLOBIN GLYCOSYLATED A1C: CPT

## 2024-11-11 PROCEDURE — G0103 PSA SCREENING: HCPCS

## 2024-11-11 PROCEDURE — 90471 IMMUNIZATION ADMIN: CPT | Performed by: NURSE PRACTITIONER

## 2024-11-11 PROCEDURE — 80050 GENERAL HEALTH PANEL: CPT

## 2024-11-11 PROCEDURE — 90656 IIV3 VACC NO PRSV 0.5 ML IM: CPT | Performed by: NURSE PRACTITIONER

## 2024-11-11 PROCEDURE — 36415 COLL VENOUS BLD VENIPUNCTURE: CPT

## 2024-11-11 PROCEDURE — 80061 LIPID PANEL: CPT

## 2024-11-11 RX ORDER — ASPIRIN 81 MG/1
81 TABLET ORAL DAILY
COMMUNITY

## 2024-11-11 NOTE — PROGRESS NOTES
"Chief Complaint  Establish Care and Dry Mouth    Subjective        Glenroy Sinha presents to [facility] today to establish care with new primary care provider.  Past medical history is negative for anything chronic in nature.  Does have some dry mouth, that just started after recently have an upper respiratory infection.  Otherwise negative, blood pressure is elevated today has not ever taken both blood pressure medications.    Did have a colonoscopy about 5 years ago with Dr. Martinez was negative , repeat in 10 years.  Willing to get flu shot today, declines COVID-vaccine or other vaccines today.      Current Outpatient Medications:     aspirin 81 MG EC tablet, Take 1 tablet by mouth Daily., Disp: , Rfl:     Loratadine (CLARITIN PO), Take  by mouth Daily., Disp: , Rfl:     multivitamin (ONE-A-DAY MENS PO), Take  by mouth Daily., Disp: , Rfl:   There are no discontinued medications.      Allergies:  Patient has no known allergies.      Objective   Vital Signs:   Vitals:    11/11/24 1449   BP: 162/97   BP Location: Left arm   Patient Position: Sitting   Pulse: 76   Temp: 98.2 °F (36.8 °C)   TempSrc: Oral   Weight: 78 kg (172 lb)   Height: 179.7 cm (70.75\")     Body mass index is 24.16 kg/m².  BMI is within normal parameters. No other follow-up for BMI required.        Physical Exam  Constitutional:       Appearance: Normal appearance.   Neck:      Vascular: No carotid bruit.   Cardiovascular:      Rate and Rhythm: Normal rate and regular rhythm.      Heart sounds: Normal heart sounds.   Pulmonary:      Effort: Pulmonary effort is normal.      Breath sounds: Normal breath sounds.   Musculoskeletal:         General: Normal range of motion.   Skin:     General: Skin is warm and dry.   Neurological:      General: No focal deficit present.      Mental Status: He is alert.   Psychiatric:         Mood and Affect: Mood normal.         Behavior: Behavior normal.       "           Procedures         Diagnoses and all orders for this visit:    1. Annual physical exam (Primary)    2. Elevated blood pressure reading in office without diagnosis of hypertension  -     Comprehensive Metabolic Panel; Future  -     CBC & Differential; Future  -     TSH; Future    3. Screening, lipid  -     Lipid Panel; Future    4. Screening for diabetes mellitus  -     Hemoglobin A1c; Future    5. Need for hepatitis C screening test  -     Hepatitis C Antibody; Future    6. Screening for prostate cancer  -     PSA Screen; Future    7. Encounter for immunization  -     Fluzone >6mos            Follow Up  Return in about 4 weeks (around 12/9/2024) for will call with lab results.  Patient was given instructions and counseling regarding his condition or for health maintenance advice. Please see specific information pulled into the AVS if appropriate.   Monitor blood pressure at home once to twice daily resting and send blood pressure readings into the office        KAYLENE Salmon  11/11/2024    Please note that portions of this document were completed using a voice recognition program.

## 2024-11-12 ENCOUNTER — TELEPHONE (OUTPATIENT)
Dept: FAMILY MEDICINE CLINIC | Age: 54
End: 2024-11-12
Payer: COMMERCIAL

## 2024-11-12 NOTE — TELEPHONE ENCOUNTER
----- Message from Vy Sinha sent at 11/11/2024  3:07 PM EST -----  Had CLN with DR Martinez , in 2019 or so , need copy for our records

## 2024-11-13 DIAGNOSIS — E78.00 HIGH CHOLESTEROL: Primary | ICD-10-CM

## 2024-11-13 NOTE — PROGRESS NOTES
Hemoglobin A1c negative for diabetes however is on the higher side. TSH normal.  CBC stable CMP stable.  Lipid elevated total cholesterol 237, , he needs to adhere to a low-cholesterol diet, please send him information and lets repeat a lipid in 1 month.  Make sure this is fasting.  If it continues to be elevated then he will need to start medication for cholesterol.

## 2024-11-26 ENCOUNTER — TELEPHONE (OUTPATIENT)
Dept: FAMILY MEDICINE CLINIC | Age: 54
End: 2024-11-26
Payer: COMMERCIAL

## 2024-11-26 NOTE — TELEPHONE ENCOUNTER
----- Message from Vy rao sent at 11/12/2024  8:34 AM EST -----  Follow-up in 2 to 3 weeks to see how his blood pressure is doing.

## 2024-11-27 DIAGNOSIS — I10 PRIMARY HYPERTENSION: Primary | ICD-10-CM

## 2024-11-27 RX ORDER — LISINOPRIL 5 MG/1
5 TABLET ORAL DAILY
Qty: 90 TABLET | Refills: 0 | Status: SHIPPED | OUTPATIENT
Start: 2024-11-27

## 2024-11-27 NOTE — TELEPHONE ENCOUNTER
These numbers are consistent with hypertension, the diastolic is not terrible, still little bit high, but the diastolic is running in the high 90s.  Will start a low-dose blood pressure medication, and he will need to continue taking these blood pressure readings after 1 week of taking the medication, set a tickle to call him back in 3 weeks for blood pressure readings, lisinopril 5 mg daily sent to pharmacy, report any side effects such as cough or facial edema

## 2024-12-09 ENCOUNTER — TELEPHONE (OUTPATIENT)
Dept: FAMILY MEDICINE CLINIC | Age: 54
End: 2024-12-09
Payer: COMMERCIAL

## 2024-12-09 NOTE — TELEPHONE ENCOUNTER
11//77  11//76  11//85  12/1-126/82  12/2-159/83  12/3-134/78  12/5-139/90  12/7-123/84  12/8-119/75

## 2024-12-23 ENCOUNTER — LAB (OUTPATIENT)
Dept: LAB | Facility: HOSPITAL | Age: 54
End: 2024-12-23
Payer: COMMERCIAL

## 2024-12-23 DIAGNOSIS — E78.00 HIGH CHOLESTEROL: ICD-10-CM

## 2024-12-23 DIAGNOSIS — E78.00 PURE HYPERCHOLESTEROLEMIA: Primary | ICD-10-CM

## 2024-12-23 LAB
CHOLEST SERPL-MCNC: 219 MG/DL (ref 0–200)
HDLC SERPL-MCNC: 32 MG/DL (ref 40–60)
LDLC SERPL CALC-MCNC: 160 MG/DL (ref 0–100)
LDLC/HDLC SERPL: 4.93 {RATIO}
TRIGL SERPL-MCNC: 147 MG/DL (ref 0–150)
VLDLC SERPL-MCNC: 27 MG/DL (ref 5–40)

## 2024-12-23 PROCEDURE — 36415 COLL VENOUS BLD VENIPUNCTURE: CPT

## 2024-12-23 PROCEDURE — 80061 LIPID PANEL: CPT

## 2024-12-23 RX ORDER — ATORVASTATIN CALCIUM 20 MG/1
20 TABLET, FILM COATED ORAL NIGHTLY
Qty: 90 TABLET | Refills: 1 | Status: SHIPPED | OUTPATIENT
Start: 2024-12-23

## 2024-12-23 NOTE — PROGRESS NOTES
LDL was 174 is now 160, total cholesterol 237 now 219 , needs to start chol med. Med has been sent to pharmacy , repeat fasting lipid in 3 mo, both ordered

## 2025-02-24 DIAGNOSIS — I10 PRIMARY HYPERTENSION: ICD-10-CM

## 2025-02-24 RX ORDER — LISINOPRIL 5 MG/1
5 TABLET ORAL DAILY
Qty: 90 TABLET | Refills: 0 | Status: SHIPPED | OUTPATIENT
Start: 2025-02-24 | End: 2025-02-27 | Stop reason: SDUPTHER

## 2025-02-25 ENCOUNTER — TELEPHONE (OUTPATIENT)
Dept: FAMILY MEDICINE CLINIC | Age: 55
End: 2025-02-25
Payer: COMMERCIAL

## 2025-02-25 NOTE — TELEPHONE ENCOUNTER
Caller: Glenroy Sinha    Relationship to patient: Self    Best call back number: [phone]     Patient is needing: PATIENT NEEDS A 3 MONTH SUPPLY IN ORDER FOR HIS INSURANCE TO PAY FOR HIS     lisinopril (PRINIVIL,ZESTRIL) 5 MG tablet     PLEASE SEND A PRESCRIPTION FOR 90 DAYS TO HIS PHARMACY  [facility] [address] - [phone] [facility] [phone] FX

## 2025-02-27 DIAGNOSIS — I10 PRIMARY HYPERTENSION: ICD-10-CM

## 2025-02-27 RX ORDER — LISINOPRIL 5 MG/1
5 TABLET ORAL DAILY
Qty: 90 TABLET | Refills: 1 | Status: SHIPPED | OUTPATIENT
Start: 2025-02-27

## 2025-03-11 DIAGNOSIS — E78.00 PURE HYPERCHOLESTEROLEMIA: ICD-10-CM

## 2025-03-11 RX ORDER — ATORVASTATIN CALCIUM 20 MG/1
20 TABLET, FILM COATED ORAL NIGHTLY
Qty: 90 TABLET | Refills: 1 | Status: SHIPPED | OUTPATIENT
Start: 2025-03-11

## 2025-04-07 ENCOUNTER — TELEPHONE (OUTPATIENT)
Dept: FAMILY MEDICINE CLINIC | Age: 55
End: 2025-04-07
Payer: COMMERCIAL

## 2025-04-11 ENCOUNTER — LAB (OUTPATIENT)
Dept: LAB | Facility: HOSPITAL | Age: 55
End: 2025-04-11
Payer: COMMERCIAL

## 2025-04-11 DIAGNOSIS — E78.00 PURE HYPERCHOLESTEROLEMIA: ICD-10-CM

## 2025-04-11 LAB
CHOLEST SERPL-MCNC: 120 MG/DL (ref 0–200)
HDLC SERPL-MCNC: 33 MG/DL (ref 40–60)
LDLC SERPL CALC-MCNC: 71 MG/DL (ref 0–100)
LDLC/HDLC SERPL: 2.15 {RATIO}
TRIGL SERPL-MCNC: 81 MG/DL (ref 0–150)
VLDLC SERPL-MCNC: 16 MG/DL (ref 5–40)

## 2025-04-11 PROCEDURE — 80061 LIPID PANEL: CPT

## 2025-04-11 PROCEDURE — 36415 COLL VENOUS BLD VENIPUNCTURE: CPT

## 2025-04-14 ENCOUNTER — RESULTS FOLLOW-UP (OUTPATIENT)
Dept: FAMILY MEDICINE CLINIC | Age: 55
End: 2025-04-14
Payer: COMMERCIAL

## 2025-04-14 NOTE — LETTER
Glenroy Sinha  [address] [facility] [id number]    April 14, 2025     Dear Mr. Sinha:     Your cholesterol is much improved. Continue atorvastatin 20 mg nightly. We will follow-up on cholesterol with annual exam     Below are the results from your recent visit:    Resulted Orders   Lipid Panel   Result Value Ref Range    Total Cholesterol 120 0 - 200 mg/dL    Triglycerides 81 0 - 150 mg/dL    HDL Cholesterol 33 (L) 40 - 60 mg/dL    LDL Cholesterol  71 0 - 100 mg/dL    VLDL Cholesterol 16 5 - 40 mg/dL    LDL/HDL Ratio 2.15            If you have any questions or concerns, please don't hesitate to call.         Sincerely,        Radha Sinha, APRN

## 2025-07-12 DIAGNOSIS — I10 PRIMARY HYPERTENSION: ICD-10-CM

## 2025-07-14 RX ORDER — LISINOPRIL 5 MG/1
5 TABLET ORAL DAILY
Qty: 30 TABLET | Refills: 0 | Status: SHIPPED | OUTPATIENT
Start: 2025-07-14

## 2025-07-21 DIAGNOSIS — E78.00 PURE HYPERCHOLESTEROLEMIA: ICD-10-CM

## 2025-07-22 RX ORDER — ATORVASTATIN CALCIUM 20 MG/1
20 TABLET, FILM COATED ORAL
Qty: 90 TABLET | Refills: 0 | Status: SHIPPED | OUTPATIENT
Start: 2025-07-22

## 2025-08-17 DIAGNOSIS — I10 PRIMARY HYPERTENSION: ICD-10-CM

## 2025-08-18 RX ORDER — LISINOPRIL 5 MG/1
5 TABLET ORAL DAILY
Qty: 90 TABLET | Refills: 1 | Status: SHIPPED | OUTPATIENT
Start: 2025-08-18